# Patient Record
Sex: MALE | URBAN - METROPOLITAN AREA
[De-identification: names, ages, dates, MRNs, and addresses within clinical notes are randomized per-mention and may not be internally consistent; named-entity substitution may affect disease eponyms.]

---

## 2024-11-08 ENCOUNTER — NURSING HOME VISIT (OUTPATIENT)
Dept: POST ACUTE CARE | Facility: EXTERNAL LOCATION | Age: 89
End: 2024-11-08

## 2024-11-08 DIAGNOSIS — Z99.2 ESRD (END STAGE RENAL DISEASE) ON DIALYSIS (MULTI): Primary | ICD-10-CM

## 2024-11-08 DIAGNOSIS — N18.6 ESRD (END STAGE RENAL DISEASE) ON DIALYSIS (MULTI): Primary | ICD-10-CM

## 2024-11-08 NOTE — LETTER
Patient: Seth Starr  : 1935    Encounter Date: 2024    Pt was seen in the [facility].  Pt is 88 yo male with PMH ESRD on HD morbid obesity HTN HLD  CAD CHF chronic respiratory failurehere for rehab after dc form hospital feels fine now, pt wants to stop HD, does not want to continue with HD any more  General appearance: Comfortable, no distress  ROS: No SOB  Medications reviewed  Head: Normal  Neck: Soft  Heart: Regular  Lungs: Clear  Abdomen: soft  Neur AxO x 3, mentally competent    Plan:   1) I explained to patient that by discontinuing the HD he would not last long and dies soon he understood but still wants to dc HD and do comfort care, wife present, aware, and agrees with  decision  2) Hospice referral roxanol 5 mg q 4 hr prn, ativan 1 mg q 4 h prn  3) prognosis poor    Problem List Items Addressed This Visit       ESRD (end stage renal disease) on dialysis (Multi) - Primary          Electronically Signed By: Ron River MD   24  7:20 PM

## 2024-11-09 NOTE — PROGRESS NOTES
Pt was seen in the NH.  Pt is 89 yo male with PMH ESRD on HD morbid obesity HTN HLD  CAD CHF chronic respiratory failurehere for rehab after dc form hospital feels fine now, pt wants to stop HD, does not want to continue with HD any more  General appearance: Comfortable, no distress  ROS: No SOB  Medications reviewed  Head: Normal  Neck: Soft  Heart: Regular  Lungs: Clear  Abdomen: soft  Neur AxO x 3, mentally competent    Plan:   1) I explained to patient that by discontinuing the HD he would not last long and dies soon he understood but still wants to dc HD and do comfort care, wife present, aware, and agrees with  decision  2) Hospice referral roxanol 5 mg q 4 hr prn, ativan 1 mg q 4 h prn  3) prognosis poor    Problem List Items Addressed This Visit       ESRD (end stage renal disease) on dialysis (Multi) - Primary